# Patient Record
Sex: MALE
[De-identification: names, ages, dates, MRNs, and addresses within clinical notes are randomized per-mention and may not be internally consistent; named-entity substitution may affect disease eponyms.]

---

## 2018-02-16 ENCOUNTER — HOSPITAL ENCOUNTER (INPATIENT)
Dept: HOSPITAL 42 - ED | Age: 73
LOS: 4 days | Discharge: HOME | DRG: 65 | End: 2018-02-20
Attending: INTERNAL MEDICINE | Admitting: INTERNAL MEDICINE
Payer: MEDICARE

## 2018-02-16 VITALS — BODY MASS INDEX: 31.5 KG/M2

## 2018-02-16 DIAGNOSIS — I34.0: ICD-10-CM

## 2018-02-16 DIAGNOSIS — Q23.1: ICD-10-CM

## 2018-02-16 DIAGNOSIS — E78.00: ICD-10-CM

## 2018-02-16 DIAGNOSIS — I63.40: Primary | ICD-10-CM

## 2018-02-16 DIAGNOSIS — I25.10: ICD-10-CM

## 2018-02-16 DIAGNOSIS — E66.9: ICD-10-CM

## 2018-02-16 DIAGNOSIS — Z85.46: ICD-10-CM

## 2018-02-16 DIAGNOSIS — I42.9: ICD-10-CM

## 2018-02-16 DIAGNOSIS — Z79.01: ICD-10-CM

## 2018-02-16 DIAGNOSIS — I10: ICD-10-CM

## 2018-02-16 DIAGNOSIS — I27.20: ICD-10-CM

## 2018-02-16 DIAGNOSIS — I48.2: ICD-10-CM

## 2018-02-16 DIAGNOSIS — M19.90: ICD-10-CM

## 2018-02-16 DIAGNOSIS — M10.9: ICD-10-CM

## 2018-02-16 LAB
ALBUMIN SERPL-MCNC: 4.3 G/DL (ref 3–4.8)
ALBUMIN/GLOB SERPL: 1.3 {RATIO} (ref 1.1–1.8)
ALT SERPL-CCNC: 34 U/L (ref 7–56)
APTT BLD: 32.9 SECONDS (ref 25.1–36.5)
AST SERPL-CCNC: 38 U/L (ref 17–59)
BASOPHILS # BLD AUTO: 0.03 K/MM3 (ref 0–2)
BASOPHILS NFR BLD: 0.2 % (ref 0–3)
BUN SERPL-MCNC: 32 MG/DL (ref 7–21)
CALCIUM SERPL-MCNC: 10.5 MG/DL (ref 8.4–10.5)
EOSINOPHIL # BLD: 0.1 10*3/UL (ref 0–0.7)
EOSINOPHIL NFR BLD: 0.6 % (ref 1.5–5)
ERYTHROCYTE [DISTWIDTH] IN BLOOD BY AUTOMATED COUNT: 13.3 % (ref 11.5–14.5)
GFR NON-AFRICAN AMERICAN: 50
GRANULOCYTES # BLD: 8.67 10*3/UL (ref 1.4–6.5)
GRANULOCYTES NFR BLD: 62.3 % (ref 50–68)
HDLC SERPL-MCNC: 44 MG/DL (ref 29–60)
HGB BLD-MCNC: 14.7 G/DL (ref 14–18)
INR PPP: 1.08 (ref 0.93–1.08)
LDLC SERPL-MCNC: 96 MG/DL (ref 0–129)
LYMPHOCYTES # BLD: 3.2 10*3/UL (ref 1.2–3.4)
LYMPHOCYTES NFR BLD AUTO: 22.7 % (ref 22–35)
MCH RBC QN AUTO: 30.6 PG (ref 25–35)
MCHC RBC AUTO-ENTMCNC: 33.5 G/DL (ref 31–37)
MCV RBC AUTO: 91.5 FL (ref 80–105)
MONOCYTES # BLD AUTO: 2 10*3/UL (ref 0.1–0.6)
MONOCYTES NFR BLD: 14.2 % (ref 1–6)
PLATELET # BLD: 333 10^3/UL (ref 120–450)
PMV BLD AUTO: 10.2 FL (ref 7–11)
PROTHROMBIN TIME: 12.3 SECONDS (ref 9.4–12.5)
RBC # BLD AUTO: 4.8 10^6/UL (ref 3.5–6.1)
TROPONIN I SERPL-MCNC: < 0.01 NG/ML
WBC # BLD AUTO: 13.9 10^3/UL (ref 4.5–11)

## 2018-02-16 NOTE — ED PDOC
Arrival/HPI





- General


Chief Complaint: Dizziness/Lightheaded


Time Seen by Provider: 02/16/18 19:30


Historian: Patient





- History of Present Illness


Narrative History of Present Illness (Text): 


02/16/18 19:47


A 72 year old male, whose past medical history includes hypertension, prostate 

CA, and A-fib on Pradaxa, is brought in by ambulance, presents to the emergency 

department complaining of near-syncopal episode while at work. Patient reports 

experiencing, dizziness, as well as slurred speech and had difficulty speaking 

at the time (which has now resolved). Patient denies any headache, chest pain, 

shortness of breath, focal weakness, or any other complaints.  





PMD: Dr. Ward








Past Medical History





- Provider Review


Nursing Documentation Reviewed: Yes





- Infectious Disease


Hx of Infectious Diseases: None





- Cardiac


Hx Hypertension: Yes





- Genitourinary/Gynecological


Hx Prostate Cancer: Yes


Hx Prostate Problems: Yes





- Psychiatric


Hx Substance Use: No





- Anesthesia


Hx Anesthesia: No





Family/Social History





- Physician Review


Nursing Documentation Reviewed: Yes


Family/Social History: No Known Family HX


Smoking Status: Never Smoked


Hx Alcohol Use: No


Hx Substance Use: No





Allergies/Home Meds


Allergies/Adverse Reactions: 


Allergies





No Known Allergies Allergy (Verified 02/16/18 19:40)


 








Home Medications: 


 Home Meds











 Medication  Instructions  Recorded  Confirmed


 


Allopurinol [Zyloprim] 300 mg PO DAILY 01/03/17 01/03/17


 


Dabigatran [Pradaxa] 150 mg PO DAILY 01/03/17 01/03/17


 


Fenofibrate [Triglide] 160 mg PO DAILY 01/03/17 01/03/17


 


Furosemide [Lasix] 40 mg PO DAILY 01/03/17 01/03/17


 


Lisinopril [Zestril] 40 mg PO DAILY 01/03/17 01/03/17


 


Nebivolol [Bystolic] 20 mg PO DAILY 01/03/17 01/03/17


 


Pravastatin Sodium [Pravachol] 40 mg PO DAILY 01/03/17 01/03/17


 


Spironolactone [Aldactone] 25 mg PO DAILY 01/03/17 01/03/17














Review of Systems





- Physician Review


All systems were reviewed & negative as marked: Yes





- Review of Systems


Respiratory: absent: SOB


Cardiovascular: absent: Chest Pain, Syncope (near-syncopal episode)


Neurological: Dizziness, Speech Changes (patient was experieincing slurred 

speech and difficulty speaking at time of near-syncopal episode while at work.)

.  absent: Headache, Focal Weakness





Physical Exam


Vital Signs











  Temp Pulse Resp BP Pulse Ox


 


 02/16/18 23:23   89  16  137/82  97


 


 02/16/18 20:30   93 H  17  140/85  95


 


 02/16/18 20:03  98.5 F  90  17  123/58 L  98











Mental Status: Positive for: Alert and Oriented X 3





- Systems Exam


Head: Present: Atraumatic, Normocephalic


Pupils: Present: PERRL


Extroacular Muscles: Present: EOMI


Conjunctiva: Present: Normal


Mouth: Present: Moist Mucous Membranes


Neck: Present: Normal Range of Motion


Respiratory/Chest: Present: Clear to Auscultation, Good Air Exchange.  No: 

Respiratory Distress, Accessory Muscle Use


Cardiovascular: Present: Regular Rate and Rhythm, Normal S1, S2.  No: Murmurs


Abdomen: Present: Normal Bowel Sounds.  No: Tenderness, Distention, Peritoneal 

Signs


Back: Present: Normal Inspection


Upper Extremity: Present: Normal Inspection.  No: Cyanosis, Edema


Lower Extremity: Present: Normal Inspection.  No: Edema


Neurological: Present: GCS=15, CN II-XII Intact, Speech Normal


Skin: Present: Warm, Dry, Normal Color.  No: Rashes


Psychiatric: Present: Alert, Oriented x 3, Normal Insight, Normal Concentration





Medical Decision Making


ED Course and Treatment: 


02/16/18 20:01


Impression: 72 year old male with near-syncopal episode. Physical exam is 

benign.





Plan:


-- EKG


-- Chest xray


-- Head CT


-- Labs


-- Urinalysis 


-- IV Fluids


-- Reassess and disposition





Progress Notes:


02/16/18 20:40


Case had been d/w the neurologist .Pt. not a candidate for TPA given 

resolution of symptoms/on Pradaxa.





Report date: 02/16/18 21:14


EXAM: CT Head Without Intravenous Contrast


Dictated and Authenticated by: Brittney Bobo MD


IMPRESSION: Bilateral white matter changes. This is nonspecific and may include 

microangiopathic disease, small


lacunae of indeterminate chronicity, chronic infarcts and/or encephalomalacia.





02/16/18 22:15


Case discussed with Dr. Kuhn, who accepts admission under her service. Patient 

aware of and in agreement with plan.








- Lab Interpretations


Lab Results: 








 02/16/18 19:49 





 02/16/18 19:49 





 Lab Results





02/16/18 19:49: Sodium 143, Potassium 4.6, Chloride 104, Carbon Dioxide 28, 

Anion Gap 16, BUN 32 H, Creatinine 1.4, Est GFR (African Amer) > 60, Est GFR (

Non-Af Amer) 50, Random Glucose 102, Calcium 10.5, Total Bilirubin 0.5, AST 38, 

ALT 34, Alkaline Phosphatase 63, Troponin I < 0.01, Total Protein 7.7, Albumin 

4.3, Globulin 3.4, Albumin/Globulin Ratio 1.3, Triglycerides 73, Cholesterol 150

, LDL Cholesterol Direct 96, HDL Cholesterol 44


02/16/18 19:49: PT 12.3, INR 1.08, APTT 32.9


02/16/18 19:49: WBC 13.9 H, RBC 4.80, Hgb 14.7, Hct 43.9, MCV 91.5, MCH 30.6, 

MCHC 33.5, RDW 13.3, Plt Count 333, MPV 10.2, Gran % 62.3, Lymph % (Auto) 22.7, 

Mono % (Auto) 14.2 H, Eos % (Auto) 0.6 L, Baso % (Auto) 0.2, Gran # 8.67 H, 

Lymph # (Auto) 3.2, Mono # (Auto) 2.0 H, Eos # (Auto) 0.1, Baso # (Auto) 0.03











- RAD Interpretation


Narrative RAD Interpretations (Text): 





02/16/18 21:20


CXR- no acute process





Radiology Orders: 








02/16/18 19:53


HEAD W/O (CODE STROKE) [CT] Stat 


CHEST PORTABLE [RAD] Stat 











: ED Physician





- EKG Interpretation


EKG Interpretation (Text): 





02/16/18 21:20


EKG- Atrial fibrillation@ 87,NS T wave changes


Interpreted by ED Physician: Yes


Type: 12 lead EKG





- Medication Orders


Current Medication Orders: 








Sodium Chloride (Sodium Chloride 0.9%)  1,000 mls @ 100 mls/hr IV .Q10H [facility]


   Last Admin: 02/16/18 20:06  Dose: 100 mls/hr





eMAR Start Stop


 Document     02/16/18 20:06  HI  (Rec: 02/16/18 20:06  HI  [id number])


     Intravenous Solution


      Start Date                                 02/16/18


      Start Time                                 20:06














NIHSS Scale ([place])


Time Performed: 19:50





- How Severe is the Stoke


  ** Baseline


Level of Consciousness: 0=Alert


LOC to Questions: 0=Both comments correct


LOC to commands: 0=Obeys both correctly


Best Gaze: 0=Normal


Visual: 0=No visual loss


Facial: 0=Normal


Motor Arm - Left: 0=No drift


Motor Arm - Right: 0=No drift


Motor Leg - Left: 0=No drift


Motor Leg - Right: 0=No drift


Limb Ataxia: 0=Absent


Sensory: 0=Normal


Best Language: 0=No aphasia


Dysarthia: 0=Normal articulation


Extinction & Inattention (Neglect): 0=Normal, no object


Score: 0


Risk Level: No Stroke Risk





rTPA Inclusion/Exclusion





- Refusal of Treatment


Patient Refused Treatment: No





- Inclusion Criteria for Altepase


Patient is 18 years or Older: Yes


The Clinical Diagnosis of Ischemic Stroke That is Causing a Potentially 

Disabling Neurological Deficit: No


Time of Onset is Well Established to be Less Than 270 Minute Before Treatment 

Would Begin: Yes


Risk/Benefit Discussed With Patient/Family Member Present: Yes





- Exclusion Criteria for Altepase


Uncontrolled Hypertension at Time of Treatment (Systolic BP above 185 or 

Diastolic BP above 110 mmHg): No


Active Internal Bleeding: No


Known Bleeding Diathesis Including but Not Limited to: Platelets Below 100,000/

mm,PTT Above 40 sec After Heparin Use, Current Use of Oral Anitcoagulant With 

INR Greater Than 1.7 or PT Greater Than 15 secs: No


Evidence of an Intracranial Hemorrhage: No


Evidence of Major Acute Infarct With Signs Greater Than 1/3 MCA Territory: No


Suspicion of Subarachnoid Hemorrhage on Pretreatment Evaluation Even if CT Head 

Negative For Hemorrhage: No





- Warning to TPA With Conditions


Condition: Rapid Improvement





NIHSS Stroke Scale 3





- Date/Time Evaluation Performed


Date Performed: 02/16/18


Time Performed: 19:47





- Scribe Statement


The provider has reviewed the documentation as recorded by the Jordin Mantilla





Provider Scribe Attestation:


All medical record entries made by the Scribestela were at my direction and 

personally dictated by me. I have reviewed the chart and agree that the record 

accurately reflects my personal performance of the history, physical exam, 

medical decision making, and the department course for this patient. I have 

also personally directed, reviewed, and agree with the discharge instructions 

and disposition.








Disposition/Present on Arrival





- Present on Arrival


Any Indicators Present on Arrival: No


History of DVT/PE: No


History of Uncontrolled Diabetes: No


Urinary Catheter: No


History of Decub. Ulcer: No


History Surgical Site Infection Following: None





- Disposition


Have Diagnosis and Disposition been Completed?: Yes


Diagnosis: 


 Near syncope, TIA (transient ischemic attack)





Disposition: HOSPITALIZED


Disposition Time: 22:10


Patient Plan: Observation


Patient Problems: 


 Current Active Problems











Problem Status Onset


 


Near syncope Acute  


 


TIA (transient ischemic attack) Acute  











Condition: STABLE

## 2018-02-16 NOTE — CT
EXAM:

  CT Head Without Intravenous Contrast



CLINICAL HISTORY:

  72 years old, male; Signs and symptoms; Speech disturbance; Slurred speech; 

Additional info: Code stroke



TECHNIQUE:

  Axial computed tomography images of the head/brain without intravenous 

contrast.  All CT scans at this facility use one or more dose reduction 

techniques, viz.: automated exposure control; ma/kV adjustment per patient size 

(including targeted exams where dose is matched to indication; i.e. head); or 

iterative reconstruction technique.

  Coronal and sagittal reformatted images were created and reviewed.



COMPARISON:

  No relevant prior studies available.



FINDINGS:

  Brain:  Bilateral white matter changes. This is nonspecific and may include 

microangiopathic disease, small lacunae of indeterminate chronicity, chronic 

infarcts and/or encephalomalacia. No hemorrhage.  No edema.

  Ventricles:  No hydrocephalus.

  Bones:  Skull is intact.

  Sinuses:  Mild paranasal sinus mucosal thickening. No acute sinusitis.

  Mastoid air cells:  No mastoid effusion.



IMPRESSION:     

  Bilateral white matter changes. This is nonspecific and may include 

microangiopathic disease, small lacunae of indeterminate chronicity, chronic 

infarcts and/or encephalomalacia. 

  Acute infarcts/early ischemic changes may not be detectable by this modality; 

MRI is more sensitive in detecting acute ischemia.

## 2018-02-17 LAB
ERYTHROCYTE [DISTWIDTH] IN BLOOD BY AUTOMATED COUNT: 13.3 % (ref 11.5–14.5)
HGB BLD-MCNC: 14.1 G/DL (ref 14–18)
MCH RBC QN AUTO: 30.5 PG (ref 25–35)
MCHC RBC AUTO-ENTMCNC: 33.5 G/DL (ref 31–37)
MCV RBC AUTO: 91.1 FL (ref 80–105)
PLATELET # BLD: 316 10^3/UL (ref 120–450)
PMV BLD AUTO: 9.9 FL (ref 7–11)
RBC # BLD AUTO: 4.62 10^6/UL (ref 3.5–6.1)
WBC # BLD AUTO: 9.3 10^3/UL (ref 4.5–11)

## 2018-02-17 NOTE — CARD
--------------- APPROVED REPORT --------------





EKG Measurement

Heart [id number]

[id number]

[id number]

[id number]



<Conclusion>

Atrial fibrillation

Minimal voltage criteria for LVH, may be normal variant

Nonspecific T wave abnormality

No change

## 2018-02-17 NOTE — CP.PCM.CON
History of Present Illness





- History of Present Illness


History of Present Illness: 





Neurology Consultation Note:





Mr. Mack is a 72-year-old man with a past medical history of hypertension

, prostate CA, and A-fib on Pradaxa, who presented to the ED yesterday after an 

episode of slurred speech, dizziness and near-syncope.  When he was evaluated 

in the ED, he was back to baseline and asymptomatic.  However, due to his 

multiple risk factors for stroke and the duration of symptoms, he was admitted 

for posterior circulation TIA. 





Review of Systems





- Review of Systems


All systems: reviewed and no additional remarkable complaints except





Past Patient History





- Infectious Disease


Hx of Infectious Diseases: None





- Past Social History


Smoking Status: Never Smoked





- CARDIAC


Hx Cardiac Disorders: Yes


Hx Cardia Arrhythmia: Yes (A-fib)


Hx Hypercholesterolemia: Yes


Hx Hypertension: Yes





- PULMONARY


Hx Respiratory Disorders: No





- NEUROLOGICAL


Hx Neurological Disorder: No





- HEENT


Hx HEENT Problems: No





- RENAL


Hx Chronic Kidney Disease: No





- HEMATOLOGICAL/ONCOLOGICAL


Hx Blood Disorders: Yes


Hx Anemia: Yes





- INTEGUMENTARY


Hx Dermatological Problems: No





- MUSCULOSKELETAL/RHEUMATOLOGICAL


Hx Musculoskeletal Disorders: Yes


Hx Falls: No


Hx Fractures: Yes (knee surgery)


Hx Gout: Yes





- GASTROINTESTINAL


Hx Gastrointestinal Disorders: No





- GENITOURINARY/GYNECOLOGICAL


Hx Genitourinary Disorders: Yes


Hx Prostate Problems: Yes (HX of prostate cancer)





- PSYCHIATRIC


Hx Psychophysiologic Disorder: No





- SURGICAL HISTORY


Hx Surgeries: Yes


Hx Cardiac Catheterization: Yes (PTCA)


Hx Cholecystectomy: Yes


Hx Orthopedic Surgery: Yes (knee surgery)





- ANESTHESIA


Hx Anesthesia: No





Meds


Allergies/Adverse Reactions: 


 Allergies











Allergy/AdvReac Type Severity Reaction Status Date / Time


 


No Known Allergies Allergy   Verified 18 19:40














- Medications


Medications: 


 Current Medications





Acetaminophen (Tylenol 325mg Tab)  650 mg PO Q6H PRN


   PRN Reason: Fever >100.4 F


Allopurinol (Zyloprim)  300 mg PO DAILY [facility]


Atorvastatin Calcium (Lipitor)  40 mg PO DIN [facility]


   Last Admin: 18 17:00 Dose:  40 mg


Dabigatran (Pradaxa)  75 mg PO BID [facility]


   PRN Reason: Protocol


   Last Admin: 18 17:00 Dose:  75 mg


Fenofibrate (Tricor)  145 mg PO DAILY [facility]


Furosemide (Lasix)  40 mg PO DAILY [facility]


Sodium Chloride (Sodium Chloride 0.9%)  1,000 mls @ 50 mls/hr IV .Q20H [facility]


   Last Admin: 18 10:57 Dose:  50 mls/hr


Lisinopril (Zestril)  20 mg PO BID [facility]


   Last Admin: 18 17:00 Dose:  20 mg


Nitroglycerin (Nitro-Bid 2% Oint)  1 ea TOP Q4H PRN


   PRN Reason: accelerated hypertension


Ondansetron HCl (Zofran Inj)  4 mg IVP Q6H PRN


   PRN Reason: Nausea/Vomiting


Spironolactone (Aldactone)  12.5 mg PO DAILY [facility]











Physical Exam





- Constitutional


Appears: Well





- Head Exam


Head Exam: ATRAUMATIC, NORMAL INSPECTION, NORMOCEPHALIC





- Eye Exam


Eye Exam: EOMI, Normal appearance, PERRL





- Neck Exam


Neck exam: Positive for: Normal Inspection





- Respiratory Exam


Respiratory Exam: Clear to Auscultation Bilateral, NORMAL BREATHING PATTERN





- GI/Abdominal Exam


GI & Abdominal Exam: Normal Bowel Sounds, Soft.  absent: Tenderness





- Rectal Exam


Rectal Exam: Deferred





- Back Exam


Back exam: NORMAL INSPECTION





- Neurological Exam


Neurological exam: Alert, CN II-XII Intact, Normal Gait, Oriented x3, Reflexes 

Normal


Additional comments: 





NIHSS = 0





- Psychiatric Exam


Psychiatric exam: Normal Affect, Normal Mood





- Skin


Skin Exam: Dry, Intact, Normal Color, Warm





Results





- Vital Signs


Recent Vital Signs: 


 Last Vital Signs











Temp  98.2 F   18 12:00


 


Pulse  61   18 14:00


 


Resp  17   18 12:00


 


BP  113/68   18 12:00


 


Pulse Ox  96   18 05:33














- Labs


Result Diagrams: 


 18 10:40





 18 19:49


Labs: 


 Laboratory Results - last 24 hr











  18





  10:40


 


WBC  9.3  D


 


RBC  4.62


 


Hgb  14.1


 


Hct  42.1


 


MCV  91.1


 


MCH  30.5


 


MCHC  33.5


 


RDW  13.3


 


Plt Count  316


 


MPV  9.9














Assessment & Plan


(1) TIA (transient ischemic attack)


Assessment and Plan: 


The patient is on Pradaxa for atrial fibrillation and is stable without 

complications.  I recommend continuing this medication for stroke prevention.  

In addition, I recommend the followin. Telemetry


2. MRI brain without contrast, and MRA of the head/neck without contrast


3. Echocardiogram with bubble study


4. Continue Lipitor at current dose


5. PT/OT eval and treatment


6. Fluids with NS at 100 mL/hr


7. Check lipid panel, HbA1c, TSH, B12, folate, vitamin D levels


8. Case management consult





Thank you. 








Status: Acute   Priority: High

## 2018-02-17 NOTE — RAD
HISTORY:

Code Stroke  



COMPARISON:

No prior. 



FINDINGS:



LUNGS:

No active pulmonary disease.



PLEURA:

No significant pleural effusion identified, no pneumothorax apparent.



CARDIOVASCULAR:

Normal.



OSSEOUS STRUCTURES:

No significant abnormalities.



VISUALIZED UPPER ABDOMEN:

Normal.



OTHER FINDINGS:

None.



IMPRESSION:

No active disease.

## 2018-02-17 NOTE — HP
DATE OF EXAM:  02/17/2018



HISTORY OF PRESENT ILLNESS:  This 72-year-old male was examined at his

bedside in the presence of nurse, Meagan Ascencio, registered nurse.  The

patient was admitted late last evening through the emergency room with

concerns of possible transient ischemic attack.  According to the patient,

he came home last evening felt dizzy, also was noticing problems with his

speech and was concerned about the possibility of a stroke.  He came to the

[facility] (formerly [facility]) at that time.  Upon arrival, had no further symptoms

of either dizziness or slurred speech.  The patient is admitted to rule out

transient ischemic attack.



PAST MEDICAL HISTORY:  The patient's past medical history is extensive and

includes history of atherosclerotic heart disease stable.  He denies any

knowledge of cardiac cath or stroke.  He recently in the month of January 2017

underwent cardiac testing including 2D echocardiogram and stress test that

were notable for enlarged left atrium, borderline left ventricular ejection

fraction, and evidence of mitral regurgitation with an unremarkable stress

test in the setting of longstanding chronic atrial fibrillation.  The

patient also has history of hyperlipidemia, hypertension, gout, obesity.



OUTPATIENT MEDICATIONS:  Include Aldactone, Lasix, Lipitor, Pradaxa,

Tricor, Zestril, Zyloprim, Pravachol, Bystolic.  Of note, the patient was

given IV Lopressor in the emergency room for accelerated hypertension. 

Also takes digoxin as an outpatient and was noted to have episodes of

bradyarrhythmia on the cardiac monitor earlier this morning.



ALLERGIES:  PATIENT DENIES ANY ALLERGIES TO MEDICATION.



He is status post prostate cancer, has completed Lupron injections with his

[facility] urologist and states he has had a gallbladder removal and

left knee surgery in the distant past.  The patient states he has had

chronic atrial fibrillation since 2006, was initially treated with Coumadin

which was then switched to Pradaxa because of issues with bruising on

Coumadin.



REVIEW OF SYSTEMS:

CONSTITUTIONAL:  He denied any fever or chills.

HEAD:  Denied knowledge of seizure or stroke.

EYE:  No change in visual acuity.

EARS:  No hearing loss.

THROAT:  No swallowing difficulty.

NECK:  No stiffness.

CARDIAC:  As per HPI.

PULMONARY:  No cough.  No hemoptysis.

GASTROINTESTINAL:  He has a history of gallbladder removal.

GENITOURINARY:  He has prostate cancer.

VASCULAR:  No claudication.

PSYCHOLOGICAL:  No depression.

NEUROLOGICAL:  No knowledge of stroke.

HEMATOLOGICAL:  No knowledge of anemia.

ENDOCRINOLOGICAL:  No diabetes, but hyperlipidemia.



FAMILY HISTORY: Noncontributory.



SOCIAL HISTORY:  He is a nondrinker, nonsmoker, non IV drug misuser.  He is

employed as a .



PHYSICAL EXAMINATION:

VITAL SIGNS:  Cardiac monitor, atrial fibrillation.  Temperature 98.2,

respirations 17, pulse 61, blood pressure 113/68, and pulse ox 96% on room

air.

HEENT:  Head normocephalic, atraumatic.  Eyes:  No icterus.  Ears:  Clear. 

Throat:  Noninjected.

NECK:  Supple.

HEART:  Irregular, S1, S2.

LUNGS:  Clear.

ABDOMEN:  Obese.  Well-healed vertical incisional scar from previous

cholecystectomy.

EXTREMITIES:  No clubbing, no cyanosis, no edema.

SKIN:  Without rash.

NEUROLOGICAL:  Intact.  5/5 motor strength.  Sensory intact.  Cranial

nerves intact. Speech is fluent.



LABORATORY DATA:  Initial white count 13,900, now 9300; hemoglobin 14.1;

hematocrit 42.1; platelets rate 316,000.  PT/INR 1.08, PTT 32.9.  Sodium

143, K 4.6, chloride 104, bicarb 28.  BUN 32, creatinine 1.4.  Random blood

sugar 102.  Hemoglobin A1c 6.3.  Bilirubin 0.5, AST 38, ALT 34, alk phos

63.  Troponin less than 0.01.  Cholesterol 150, triglyceride 73, LDL 96,

HDL 44.  Chest x-ray was reviewed.  It showed no active pulmonary disease,

no pneumothorax, no infiltrate, no pleural effusion.  EKG showed atrial

fibrillation with nonspecific ST-T wave changes.  Head CT was reviewed. 

There were bilateral white matter changes.  This was felt to be

nonspecific.



IMPRESSION:  A 72-year-old male with admission for dizziness and speech

impediment, now resolved, rule out transient ischemic attack with

comorbidities of obesity, stable atherosclerotic heart disease, chronic

hypertension, chronic atrial fibrillation, hyperlipidemia, degenerative

arthritis, and gout.



PLAN:  The plan as discussed with the patient, emergency room physician,

and Nursing at bedside will be to maintain this patient on the cardiac

unit.  He is ordered to have physical therapy for reconditioning and gait

training while maintaining neuro checks q. shift, high fall risk protocol,

and neurological evaluation with Dr. Samuel Swain from Neurology.  He is

ordered to have a heart-healthy soft bland diet, nasal O2 p.r.n.  He will

continue on Zyloprim 300 mg p.o. daily, Zestril 20 mg p.o. b.i.d., Tricor

145 mg p.o. daily, 0.9 saline at 50 mL/hour, Pradaxa 75 mg p.o. b.i.d.,

nitroglycerin 1 inch to chest wall q. 4 hours if systolic blood pressure

greater than 160 or diastolic blood pressure greater than 100, Lipitor 40

mg p.o. daily, Lasix 40 mg p.o. daily, and Aldactone 12.5 mg p.o. daily. 

He is ordered to have a basic metabolic panel and lipid panel in the a.m.,

and I have asked the nurse to coordinate with Dr. Tiburcio Swain any additional

neurological testing.  He may deem fit at this point.  Echocardiography and

stress testing were reviewed from January 2017, and I will await further

evaluation by Neurology before ordering additional testing at this time.



Greater than 75 minutes was spent in the care management, review of x-rays,

labs, medication, and orders for this patient today.  All questions were

answered.











__________________________________________

Polly Kuhn MD



DD:  02/17/2018 15:41:14

DT:  02/17/2018 15:44:06

Job # [id number]

[id number]

## 2018-02-18 LAB
BUN SERPL-MCNC: 24 MG/DL (ref 7–21)
CALCIUM SERPL-MCNC: 9.7 MG/DL (ref 8.4–10.5)
FOLATE SERPL-MCNC: 10.5 NG/ML
GFR NON-AFRICAN AMERICAN: 59
HDLC SERPL-MCNC: 33 MG/DL (ref 29–60)
LDLC SERPL-MCNC: 99 MG/DL (ref 0–129)
VIT B12 SERPL-MCNC: 309 PG/ML (ref 239–931)

## 2018-02-18 NOTE — CP.PCM.PN
Subjective





- Date & Time of Evaluation


Date of Evaluation: 02/18/18


Time of Evaluation: 07:11





- Subjective


Subjective: 





Mr. Mack was seen and examined at the bedside. He is alert, oriented in 

all spheres. He denies any headache, blurred vision, diplopia, dizziness, 

lightheadedness, nausea, or vomiting. He is able to follow simple commands. 

There was no untoward events overnight.





Objective





- Vital Signs/Intake and Output


Vital Signs (last 24 hours): 


 











Temp Pulse Resp BP Pulse Ox


 


 97.3 F L  59 L  20   113/63   96 


 


 02/18/18 05:50  02/18/18 05:50  02/18/18 05:50  02/18/18 05:50  02/18/18 05:50








Intake and Output: 


 











 02/18/18 02/18/18





 06:59 18:59


 


Intake Total 720 


 


Output Total 1500 


 


Balance -780 














- Medications


Medications: 


 Current Medications





Acetaminophen (Tylenol 325mg Tab)  650 mg PO Q6H PRN


   PRN Reason: Fever >100.4 F


Allopurinol (Zyloprim)  300 mg PO DAILY [facility]


Atorvastatin Calcium (Lipitor)  40 mg PO DIN [facility]


   Last Admin: 02/17/18 17:00 Dose:  40 mg


Dabigatran (Pradaxa)  75 mg PO BID [facility]


   PRN Reason: Protocol


   Last Admin: 02/17/18 17:00 Dose:  75 mg


Fenofibrate (Tricor)  145 mg PO DAILY [facility]


Furosemide (Lasix)  40 mg PO DAILY [facility]


Sodium Chloride (Sodium Chloride 0.9%)  1,000 mls @ 50 mls/hr IV .Q20H [facility]


   Last Admin: 02/18/18 05:53 Dose:  50 mls/hr


Lisinopril (Zestril)  20 mg PO BID [facility]


   Last Admin: 02/17/18 17:00 Dose:  20 mg


Nitroglycerin (Nitro-Bid 2% Oint)  1 ea TOP Q4H PRN


   PRN Reason: accelerated hypertension


Ondansetron HCl (Zofran Inj)  4 mg IVP Q6H PRN


   PRN Reason: Nausea/Vomiting


Spironolactone (Aldactone)  12.5 mg PO DAILY [facility]











- Labs


Labs: 


 





 02/17/18 10:40 





 02/18/18 05:40 





 











PT  12.3 SECONDS (9.4-12.5)   02/16/18  19:49    


 


INR  1.08  (0.93-1.08)   02/16/18  19:49    


 


APTT  32.9 Seconds (25.1-36.5)   02/16/18  19:49    














- Constitutional


Appears: No Acute Distress





- Head Exam


Head Exam: NORMAL INSPECTION





- Neurological Exam


Neurological Exam: Alert, Awake, CN II-XII Intact, Oriented x3


Neuro motor strength exam: Left Upper Extremity: 5, Right Upper Extremity: 5, 

Left Lower Extremity: 5, Right Lower Extremity: 5


Additional comments: 





He is alert, oriented x3, follows commands. Sensation is intact.





Assessment and Plan


(1) TIA (transient ischemic attack)


Assessment & Plan: 


Case discussed with Dr. Swain, continue all current medical, physical, and 

occupational therapies. Pending MRI of the brain, MRA of the heada and neck, 

echocardiogram with bubble study. Recommend to check TSH, B12, folate, vitamin 

D levels.


Status: Acute

## 2018-02-18 NOTE — MRI
PROCEDURE:  MRI BRAIN WITHOUT CONTRAST



HISTORY:

TIA



COMPARISON:

Comparison made with prior CT scan of the brain dated 02/16/2018 



TECHNIQUE:

Multiplanar, multisequence MR images of the brain were obtained 

without intravenous contrast enhancement. This examination is limited 

due to motion artifact. 



FINDINGS:



HEMORRHAGE:

No acute parenchymal, subarachnoid or extra-axial hemorrhage. No 

evidence of hemosiderin deposition identified on gradient echo 

weighted sequence.



DWI:

There is a small acute/ subacute infarct in the right anterolateral 

and mid-superior vermis. No additional acute infarct so far as can be 

seen.  Note that the diffusion imaging sequences quite limited due to 

crossing artifact



BRAIN PARENCHYMA:

Mild diffuse/ confluent chronic periventricular white matter ischemic 

changes with multiple tiny lacunar type infarcts scattered about the 

deep and subcortical white matter both cerebral hemispheres. 

Additionally, there are chronic appearing brainstem lacunar type 

ischemic changes.  There may also be scattered chronic bilateral 

basal nuclei lacunar type infarcts difficult to distinguish from 

numerous dilated perivascular spaces. 



Moderate volume loss. 



VENTRICLES:

No obstructive hydrocephalus.



CRANIUM:

There are no acute calvarial fractures.



ORBITS:

Orbits and contents grossly unremarkable.



PARANASAL SINUSES/MASTOIDS:

Minimal mucosal thickening noted within the ethmoid air complex



VASCULAR SYSTEM:

Visualized major vascular flow voids at skull base are patent.



OTHER FINDINGS:

None. 



IMPRESSION:

Limited motion degraded study. 



There is a small acute/ subacute right vermian infarct.



Mild chronic white matter and brainstem ischemic changes. There may 

also be a few scattered chronic bilateral basal nuclei lacunar type 

infarcts difficult to distinguish from numerous dilated perivascular 

spaces. 



Moderate volume loss. 



Acute findings discussed with [address] [place] charge nurse Tam at 

approximately 1:21 p.m. with written down and read back verification.

## 2018-02-18 NOTE — MRI
PROCEDURE:  MRA of the brain dated 02/18/2018



HISTORY:

TIA



COMPARISON:

None available. 



TECHNIQUE:

3D time of flight MR angiography of the intracranial arteries was 

performed. Rotating maximum intensity projection images were 

generated. Study is limited by with duplication of the intra cerebral 

vasculature possibly secondary to utilization of the flex coil for 

this exam as patient was unable to fit into standard coronal. 



FINDINGS:



INTERNAL CAROTID ARTERIES:

Unremarkable. The skull base, petrous, cavernous and supraclinoid 

segments are bilaterally widely patient. 



ANTERIOR CEREBRAL ARTERIES:

Unremarkable. A1 and A2 segments are widely patent. Smaller distal 

branches unremarkable, as visualized.



MIDDLE CEREBRAL ARTERIES:

Unremarkable. M1 and M2 segments are widely patent. Perisylvian 

branches grossly symmetric.



POSTERIOR CIRCULATION:

Basilar Artery: Unremarkable.



Distal Vertebral Arteries: Unremarkable.



Posterior Cerebral Arteries: Unremarkable.



Posterior Inferior Cerebellar Arteries: Unremarkable.



ANEURYSM/ VASCULAR MALFORMATIONS:

No evidence of large aneurysm nor vascular malformation. .



OTHER FINDINGS:

None. 



IMPRESSION:

Limited study as described.  None no evidence of occlusion or 

significant stenotic lesion.  No definitive evidence of large 

aneurysm nor vascular malformation on within limitation of the exam

## 2018-02-18 NOTE — CARD
--------------- APPROVED REPORT --------------





EKG Measurement

Heart [id number]

[id number]

[id number]

[id number]



<Conclusion>

Atrial fibrillation

NSSTW changes

No change

## 2018-02-18 NOTE — MRI
PROCEDURE:  MRA angiography of the neck dated 02/18/2018 and 



HISTORY:

TIA 



COMPARISON:

No prior study available for comparison 



TECHNIQUE:

2D and 3D time-of-flight (TOF) angiography of the neck was performed. 

Rotating maximum intensity projection images of the cervical carotid 

and vertebral arteries were generated. The origins of the common 

carotid arteries were not visualized, which is a limitation inherent 

to the non-contrast time of flight technique. Examination is limited 

likely due to utilization of flex coil for this study as patient was 

unable to fit into standard coil



FINDINGS:

The the carotid bifurcations proximal and distal internal carotid 

arteries are not visualized on the and 3D time-of-flight sequences 

though are visible though poorly seen on the 2D time-of-flight 

imaging .  There is also a abrupt cut off of a short segment of the 

right vertebral artery on 3D imaging though.  This segment is visible 

on 2D imaging.  No definitive evidence of occlusion or significant 

stenosis however carotid Doppler study is recommended for further 

evaluation 



RIGHT CAROTID ARTERIES:

Common Carotid Artery: Normal.



Carotid Bifurcation: Normal.



Internal Carotid Artery:Normal.



External Carotid Artery (proximal branches): Normal.



LEFT CAROTID ARTERIES:

Common Carotid Artery: Normal.



Carotid Bifurcation: Normal.



Internal Carotid Artery:Normal.



External Carotid Artery (proximal branches): Normal.



VERTEBRAL ARTERIES:

Right Vertebral Artery: Normal.



Left Vertebral Artery: Normal.



OTHER FINDINGS:

None.



IMPRESSION:

There is abrupt cut off of the of a short segment of the right 

vertebral artery on the 3D time-of-flight at imaging felt to be 

secondary to artifact on the 3D sequences as the same segment of 

vertebral arteries visible on 2D sequences on the. New.  The carotid 

bifurcations and distal internal carotid arteries are also not well 

visualized on the 3D sequences though are visualized on 2D sequences. 

Recommend follow-up carotid Doppler exam.

## 2018-02-18 NOTE — PN
DATE:  02/18/2018



SUBJECTIVE:  This 72-year-old male was examined at the bedside and this

case was reviewed in detail with himself and his nurse, Debra Turcios. 

The patient remains alert and oriented.  He is denying any chest pain,

fever, chills, shortness of breath, slurred speech or motor weakness.  He

has been seen by Neurology and he is awaiting testing including MRA of the

brain and neck as well as MRI and additional lab testing including vitamin

B12, vitamin D 25-hydroxy and folic acid levels.  The patient remains in

atrial fibrillation rhythm on the cardiac monitor and has had no further

pauses since withholding his digoxin and beta-blockers.



PHYSICAL EXAMINATION:

VITAL SIGNS:  Temperature is 97.9, respirations 20, pulse 75, blood

pressure 122/74, pulse ox 97% on room air.

HEENT:  Head:  Normocephalic, atraumatic.  Eyes:  No icterus.  Ears: 

Clear.  Throat:  Noninjected.

NECK:  Supple.

HEART:  Irregular S1, S2.

LUNGS:  Clear.

ABDOMEN:  Soft.

EXTREMITIES:  No edema.

SKIN:  Without rash.

NEUROLOGICAL:  Intact.  Motor strength 5/5.  Cranial nerves intact.

PSYCHOLOGICAL:  Alert and oriented x3.  Speech fluent.

VASCULAR:  Legs warm to touch.



LABORATORY DATA AND IMAGING:  White count 9300, hemoglobin 14.1, hematocrit

42.1, platelets 316,000.  PT/INR 1.08.  Sodium 142, K 4.3, chloride 105,

bicarb 26, BUN 24, creatinine 1.2, random blood sugar 106.  Estimated GFR

59 mL/minute.  Cholesterol 152, triglycerides 113, LDL 99, HDL 33.  TSH

normal 2.28.  Troponin less than 0.01.  Hemoglobin A1c 6.3.  Head CT showed

no acute infarct or hemorrhage.  EKG showed atrial fibrillation with

nonspecific ST-T wave changes.  Chest x-ray showed no evidence of

infiltrate, pneumothorax, effusion or congestive heart failure.



IMPRESSION AND PLAN:  This is a 72-year-old male status post transient

ischemic attack, rule out stroke who presented with dizziness and altered

speech at home, now resolved with comorbidities of stable atherosclerotic

heart disease, history of hypertension, hyperlipidemia, chronic atrial

fibrillation, degenerative arthritis and gout.  The plan as discussed with

the patient and nursing will be to continue spironolactone, Lasix, Lipitor,

Pradaxa, 0.9 saline, TriCor, Zestril and allopurinol.  He is scheduled for

echo, MRA of neck, MRA of brain, MRI.  He continues on heart-healthy diet,

fall protocol, neuro checks q.6 and cardiac monitoring with physical and

occupational therapy requested.  Based on clinical results and progress,

additional testings will be entertained and all of the above were reviewed

in detail with the patient, nursing and Neurology co-consultants.  Greater

than 35 minutes was spent in the care management, review of x-rays, labs,

medication and ordering of testing for this patient today.  All questions

were answered.





__________________________________________

Polly Kuhn MD





DD:  02/18/2018 14:35:22

DT:  02/18/2018 14:37:18

Job # [id number]

MTDD

## 2018-02-19 NOTE — CT
PROCEDURE:  CT Angiography of the neck with contrast



HISTORY:

Stroke



COMPARISON:

None available. 



TECHNIQUE:

Contiguous axial images of the neck were obtained from the level of 

the skull-base to the superior mediastinum in the arteriographic 

phase of enhancement. Coronal and sagittal reformats or also 

generated. 



IV contrast dose: 150 cc of Omni 350



Radiation Dose - DLP: 604 mGy-cm 



This CT exam was performed using one or more of the following dose 

reduction techniques: Automated exposure control, adjustment of the 

mA and/or kV according to patient size, and/or use of iterative 

reconstruction technique.



FINDINGS:



RIGHT CAROTID ARTERIES:

Common Carotid Artery: Normal.



Carotid Bifurcation: Normal.



Internal Carotid Artery:Normal.



External Carotid Artery (proximal branches): Normal.



LEFT CAROTID ARTERIES:

Common Carotid Artery: Normal.



Carotid Bifurcation: Normal.



Internal Carotid Artery:Normal.



External Carotid Artery (proximal branches): Normal.



VERTEBRAL ARTERIES:

Right Vertebral Artery: Normal.



Left Vertebral Artery: Normal.



OTHER FINDINGS:

There is tortuosity of the internal carotids



IMPRESSION:

No carotid or vertebral stenosis



CT Angiography of the Brain.



HISTORY:

Stroke



COMPARISON:

None available. 



TECHNIQUE:

CT angiography of the intracranial arteries was performed. Coronal 

and sagittal maximum intensity projection reformated images were 

generated.



This CT exam was performed using one or more of the following dose 

reduction techniques: Automated exposure control, adjustment of the 

mA and/or kV according to patient size, and/or use of iterative 

reconstruction technique.



FINDINGS:



INTERNAL CEREBRAL ARTERIES:

Unremarkable. The skull base, petrous, cavernous and supraclinoid 

segments are bilaterally widely patent. 



ANTERIOR CEREBRAL ARTERIES:

Unremarkable. A1 and A2 segments are widely patent. Smaller distal 

branches unremarkable, as visualized.



MIDDLE CEREBRAL ARTERIES:

Unremarkable. M1 and M2 segments are widely patent. Perisylvian 

branches grossly symmetric.



POSTERIOR CIRCULATION:

Basilar Artery: Unremarkable.



Distal Vertebral Arteries: Unremarkable.



Posterior Cerebral Arteries: Unremarkable.



Posterior Inferior Cerebellar Arteries: Unremarkable.



ANEURYSM/ VASCULAR MALFORMATIONS:

None.



OTHER FINDINGS:

None. 



IMPRESSION:

Unremarkable CT Angiography of the Brain.

## 2018-02-19 NOTE — PN
DATE:  02/19/2018



SUBJECTIVE:  This 72-year-old male was examined at the bedside and the case

was reviewed in detail with himself and Nursing.  There was an 

present for the interview.  The patient now has evidence of a right vermian

infarct on MRI and CTA of the head and neck as well as carotid ultrasounds

are pending.  Also pending is the patient's 2D echocardiogram.  The patient

denies any further slurred speech, dizziness, or syncopal events and is

ambulating independently on the cardiac monitor.  He denies fever, chills,

chest pain, or shortness of breath and is in an atrial fibrillation rhythm

on the cardiac monitor.



OBJECTIVE:

VITAL SIGNS:  Temperature 97.5, respirations 20, pulse 70, and blood

pressure 116/62 with a pulse ox of 98% room air.

HEENT:  Head normocephalic, atraumatic.  Eyes:  No icterus.  Ears:  Clear. 

Throat:  Noninjected.

NECK:  Supple.

HEART:  Irregular, S1, S2.

LUNGS:  Clear.

ABDOMEN:  Soft.

EXTREMITIES:  No edema.

SKIN:  Without rash.

NEUROLOGICAL:  Intact.  Cranial nerves II through XII intact.

PSYCHOLOGICAL:  Alert.

VASCULAR:  Legs warm to touch.



LABORATORY DATA:  White count 9300, hemoglobin 14.1, hematocrit 42.1, and

platelets 316,000.  PT/INR 1.08, PTT 32.9.  Sodium 142, potassium 4.3,

chloride 105, bicarb 26.  BUN 24, creatinine 1.2.  Random blood sugar 106. 

Cholesterol 152, triglycerides 113, LDL 99, HDL 33.  B12 normal 309. 

Vitamin D level 36.2, normal.  Folic acid 10.5, normal.  TSH 2.28 normal.



IMPRESSION:  A 72-year-old male with a right vermian infarct on MRI, who

presented with history of slurred speech and dizziness with near syncope

with comorbidities of obesity, chronic hypertension, chronic atrial

fibrillation, previously on Pradaxa, now upgraded to Eliquis 5 mg p.o.

b.i.d. by Neurology, also with hyperlipidemia, degenerative arthritis, and

gout.  The plan as discussed with the patient, Nursing, and Neurology will

be to maintain this patient on the cardiac unit.  He is receiving physical

therapy for ambulation safety.  He remains on neuro checks q. shift and

fall precautions and is continuing on a heart-healthy diet.  He is

scheduled for CTA of the head and neck, carotid ultrasound, and

echocardiography is pending.  He continues on Zyloprim, Zestril, Tricor,

Lipitor, Lasix, Eliquis, and Aldactone.  Ultimate plan will be for

discharge to home when medically cleared by Neurology.



Greater than 35 minutes was spent in the care, review of x-rays, labs,

medication, and ordering of testings and discussion with this patient

regarding all of the above.  All questions were answered.









__________________________________________

Polly Kuhn MD



DD:  02/19/2018 14:54:21

DT:  02/19/2018 14:57:44

Job # [id number]

MTDD

## 2018-02-19 NOTE — US
PROCEDURE:  Bilateral carotid artery duplex ultrasound 



HISTORY:

Carotid stenosis 



PHYSICIAN(S):  Kalen Rasmussen MD.



TECHNIQUE:

Duplex sonography and color-flow Doppler were used to evaluate the 

carotid bifurcations and limited segments of the vertebral arteries 

bilaterally.



FINDINGS:

There is mild smooth heterogeneous plaque noted at the carotid 

bifurcations bilaterally. The peak systolic velocity in the proximal 

right internal carotid artery is 60 cm/sec. This corresponds to a 20 

to 39% proximal right ICA stenosis. Normal systolic velocities are 

noted in the proximal right external carotid artery. There is 

antegrade flow in the right vertebral artery.



The peak systolic velocity in the proximal left internal carotid 

artery is 78 cm/sec. This corresponds to a 20 to 39% proximal left 

ICA stenosis. Normal systolic velocities are noted in the proximal 

left external carotid artery. There is antegrade flow in the left 

vertebral artery.



IMPRESSION:

1. Bilateral 20-39% proximal ICA stenoses.



2. Antegrade flow in both vertebral arteries.

## 2018-02-20 VITALS — RESPIRATION RATE: 16 BRPM | TEMPERATURE: 98.8 F | OXYGEN SATURATION: 98 %

## 2018-02-20 VITALS — SYSTOLIC BLOOD PRESSURE: 127 MMHG | HEART RATE: 74 BPM | DIASTOLIC BLOOD PRESSURE: 84 MMHG

## 2018-02-20 NOTE — CON
DATE:  02/20/2018



INDICATIONS:  Atrial fib, stroke.



HISTORY OF PRESENT ILLNESS:  This is a 72-year-old man admitted on the 16th

with dizziness and difficulty speaking, described as slurred speech.  His

symptoms have subsequently improved.  During his hospitalization, he has

had a neurologic evaluation.  There was evidence of a para vermal acute

ischemic stroke.  He has known atrial fibrillation.  He was on Pradaxa, but

in talking to the patient, I find that he was probably taking the Pradaxa

only once a day, possibly this subtherapeutic dosing was  the cause for the 
stroke

on an embolic basis.



There was no chest pain, shortness of breath, palpitations, orthopnea, PND,

fever, chills, cough, sputum production, hemoptysis, abdominal pain,

nausea, vomiting, diarrhea, constipation or melena.



PAST MEDICAL HISTORY:  Notable for hypertension, atrial fibrillation.  He

has apparently undergone several stress tests over the years, the most

recent one was an unremarkable nuclear stress test in 01/2017.  He has

atrial fibrillation for many years.  He has been on Coumadin and more

recently Pradaxa.  While in the hospital, he was switched to Eliquis.  He

has a history of prostate cancer and he is on Lupron.  He has a history of

hyperlipidemia, gout and gallbladder surgery.  There is no history of

rheumatic fever, myocardial infarction, congestive heart failure, diabetes.



MEDICATIONS:  At the time of admission included spironolactone, Bystolic,

Lasix, Pradaxa which he was taking once a day, Pravachol, fenofibrate,

lisinopril, allopurinol.



ALLERGIES:  THERE ARE NO KNOWN MEDICATION ALLERGIES.



CURRENT MEDICATIONS:  Include spironolactone, Eliquis, Lasix, Lipitor,

nitro paste, Tricor, lisinopril and allopurinol.  He has not been on

Bystolic though his atrial fibrillation rate is well controlled.



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  He does not

smoke, he does not drink alcohol significantly.  He is ambulatory.



FAMILY HISTORY:  Noncontributory.



REVIEW OF SYSTEMS:  A 10-point review of systems otherwise unremarkable

except as noted above.



PHYSICAL EXAMINATION:

GENERAL:  He is a well-developed male, lying in bed, on telemetry, in no

acute distress.

VITAL SIGNS:  Notable for atrial fibrillation at 54 to 65 beats per

minutes, he is afebrile, 118/68, respirations 20, O2 sat 95% to 98% on room

air.

HEENT:  Reveals no neck vein distention, thyromegaly, carotid bruits. 

Mucous membranes moist.  Conjunctivae pink.

NECK:  Supple.

LUNGS:  Lung fields clear.

HEART:  Revealed an irregular rhythm, normal first and second heart sounds,

soft systolic murmur along the left sternal border, PMI not displaced.

ABDOMEN:  Soft.  Bowel sounds present.  No mass, organomegaly, tenderness,

rebound or guarding.  No CVA tenderness.  No palpable abdominal aortic

aneurysm.

EXTREMITIES:  Revealed no cyanosis, clubbing or edema.

NEUROLOGIC:  Awake, alert and oriented.

SKIN:  Warm and dry.  No rash or cellulitis.

PSYCHIATRIC:  Normalized mood and affect.



LABORATORY DATA AND IMAGING:  A chest x-ray revealed no active disease. 

EKG demonstrates atrial fibrillation with nonspecific ST wave changes. 

Several neuro imaging reports are reviewed, apparently there is an acute

paravermal ischemic stroke.  CBC is unremarkable.  PT, INR and PTT

unremarkable.  Chemistry is unremarkable.  LFTs unremarkable.  BUN 32,

creatinine 1.4, repeat 1.2, troponin less than 0.01, cholesterol 150, LDL

96, HDL 44, TSH normal, folate normal, B12 normal, vitamin D 36.2.



IMPRESSION:  Bruce Mack is a 72-year-old man with atrial

fibrillation who was on Pradaxa, but only taking it once a day.  He appears

to had an embolic stroke, the symptoms, which have resolved.  He is now on

Eliquis.  He has been seen by Neurology.  He has undergone evaluation.  An

echocardiogram revealed evidence of a bicuspid aortic valve.  Overall, left

ventricular function was said to be mildly impaired.  There was mild mitral

regurgitation.



PLAN:  Bruce Mack has an evidence of embolic stroke, probably on

embolic basis while taking Pradaxa 150 mg once a day.  I had discussed the

case with Dr. Kuhn.  He will be switched Eliquis.  He has been informed to

take it twice a day and not to miss doses.  He will follow up with his

regular cardiologist and medical physicians upon leaving the hospital.  

He will continue his prehospitalization medications as before.  

He will report any further neurologic symptoms promptly.



A Amharic speaking nurse served as an  for this interview. I spoke 
with Dr. Kuhn regarding his case.





__________________________________________

Willard Worthington MD



DD:  02/20/2018 11:12:57

DT:  02/20/2018 16:54:55

[facility] # [id number]



JOSEPH

## 2018-02-20 NOTE — CP.PCM.PN
Subjective





- Date & Time of Evaluation


Date of Evaluation: 02/20/18


Time of Evaluation: 11:00





- Subjective


Subjective: 





Neuro progress note: 





Pt was seen and examined at the bedside.  No acute events overnight. He is able 

to following commands appropriately. Pt denies any residual symptoms. AAo x 3. 

No complaints at this time. 





12 Point ROS performed and neg other than stated above. 





Objective





- Vital Signs/Intake and Output


Vital Signs (last 24 hours): 


 











Temp Pulse Resp BP Pulse Ox


 


 98.8 F   74   16   127/84   98 


 


 02/20/18 12:00  02/20/18 12:00  02/20/18 12:00  02/20/18 12:02  02/20/18 12:00








Intake and Output: 


 











 02/20/18 02/20/18





 06:59 18:59


 


Intake Total 1000 290


 


Output Total 900 


 


Balance 100 290














- Medications


Medications: 


 Current Medications





Acetaminophen (Tylenol 325mg Tab)  650 mg PO Q6H PRN


   PRN Reason: Fever >100.4 F


Allopurinol (Zyloprim)  300 mg PO DAILY [facility]


   Last Admin: 02/20/18 12:00 Dose:  300 mg


Apixaban (Eliquis)  5 mg PO BID [facility]


   PRN Reason: Protocol


   Last Admin: 02/20/18 12:00 Dose:  5 mg


Atorvastatin Calcium (Lipitor)  40 mg PO DIN [facility]


   Last Admin: 02/19/18 18:04 Dose:  40 mg


Fenofibrate (Tricor)  145 mg PO DAILY [facility]


   Last Admin: 02/20/18 12:00 Dose:  145 mg


Furosemide (Lasix)  40 mg PO DAILY [facility]


   Last Admin: 02/20/18 12:02 Dose:  40 mg


Sodium Chloride (Sodium Chloride 0.9%)  1,000 mls @ 50 mls/hr IV .Q20H [facility]


   Last Admin: 02/19/18 23:49 Dose:  50 mls/hr


Lisinopril (Zestril)  20 mg PO BID [facility]


   Last Admin: 02/20/18 12:00 Dose:  20 mg


Nitroglycerin (Nitro-Bid 2% Oint)  1 ea TOP Q4H PRN


   PRN Reason: accelerated hypertension


Ondansetron HCl (Zofran Inj)  4 mg IVP Q6H PRN


   PRN Reason: Nausea/Vomiting


Spironolactone (Aldactone)  12.5 mg PO DAILY [facility]


   Last Admin: 02/20/18 12:01 Dose:  12.5 mg











- Labs


Labs: 


 





 02/17/18 10:40 





 02/18/18 05:40 





 











PT  12.3 SECONDS (9.4-12.5)   02/16/18  19:49    


 


INR  1.08  (0.93-1.08)   02/16/18  19:49    


 


APTT  32.9 Seconds (25.1-36.5)   02/16/18  19:49    














- Constitutional


Appears: No Acute Distress





- Eye Exam


Eye Exam: EOMI, PERRL


Pupil Exam: NORMAL ACCOMODATION





- Neurological Exam


Neurological Exam: Alert, Awake, CN II-XII Intact, Oriented x3


Neuro motor strength exam: Left Upper Extremity: 5, Right Upper Extremity: 5, 

Left Lower Extremity: 5, Right Lower Extremity: 5


Additional comments: 





72-year-old man with a past medical history of hypertension, prostate CA, and A-

fib on Pradaxa, who presented to the ED after an episode of slurred speech, 

dizziness and near-syncope 2/2 small acute/ subacute R vermian infarct as per 

MRI 





- CTA head and neck - negative 


- Cont Eliquis, and Statin 


- F/u echo report 


- Carotid US -  neg 


- F/u cardiology recs 


- HOB elevation at 45 degrees


- Maintain strict blood pressure with systolics <160 


- Control serum glucose to maintain euglycemia blood sugars 140-180


- Neuro-checks


- PT and OT 





Case and plan was reviewed and discussed with Dr Mcclain.

## 2018-02-20 NOTE — PN
DATE:  02/20/2018



SUBJECTIVE:  This 72-year-old male was examined on the cardiac harris.  This

case was reviewed in detail with his nurse at the bedside, Ara Gutierrez, who

acted as a  and I have also discussed this case in detail today

with Dr. Willard Worthington from Cardiology who saw the patient earlier today. 

Of note, the patient had head and neck CTA and carotid artery ultrasounds,

which were unremarkable for carotid artery stenosis.  He did have an

echocardiogram that was reviewed by Dr. Worthington which confirms left atrial

enlargement, left ventricle normal in size, concentric left ventricular

hypertrophy, systolic left ventricle function mildly impaired with a

probable bicuspid aortic valve, mild pulmonary hypertension, and mild

mitral regurgitation.  Since a bubble test was not done on this study, it was

ordered and reviewed by Dr. Worthington who informs me that it is negative for any

evidence of patent foramen ovale  The case was re-reviewed with Dr. Worthington,

he feels the patient is safe for discharge on Eliquis 5 mg p.o. b.i.d. and

on his review, there was no evidence of any left ventricle thrombus.  He

told the patient to follow up with his primary care physician, Dr. Chris Ward regarding the issue of his congenital bicuspid aortic valve,

which will need to be monitored serially with echocardiography in his

future.  The patient is aware of this recommendation and is in agreement. 

At present, I am awaiting Neurology followup regarding clearance for

discharge.  On review of studies, brain MRI was positive for a right

vermian infarct, probably on the basis of his chronic atrial fibrillation

and Cardiology agrees with the adjustment of his anticoagulation from

Pradaxa to Eliquis 5 mg p.o. b.i.d.  The patient is completely fluent in

speech.  He has no motor defects and is ambulating independently.  Cardiac

monitor shows chronic atrial fibrillation.



PHYSICAL EXAMINATION:

VITAL SIGNS:  Temperature 98.8, respirations 16, pulse 74, and blood

pressure 127/84 with a pulse ox of 98%.

HEENT:  Head:  Normocephalic, atraumatic.  Eyes:  No icterus.  Ears: 

Clear.  Throat:  Noninjected.

NECK:  Supple.

HEART:  Irregular S1, S2.

LUNGS:  Clear.

ABDOMEN:  Soft.

EXTREMITIES:  No edema.

SKIN:  No rash.

VASCULAR:  No claudication.  Legs warm to touch.

PSYCHOLOGIC:  Alert and oriented x3.

NEUROLOGIC:  Grossly intact.



LABORATORY DATA:  White count 9300, hemoglobin 14.1, hematocrit 42.1,

platelets 316,000.  PT/INR 1.08, PTT 32.9.  Sodium 142, K 4.3, chloride

105, bicarb 26, BUN 24, creatinine 1.2, random blood sugar 106.  Hemoglobin

A1c normal 6.3.  Troponin less than 0.01.  Cholesterol 152, triglycerides

113, LDL 99, HDL 33.  B12 is 309, vitamin D level 36.2, folic acid 10.5. 

TSH normal 2.28.



IMPRESSION:  A 72-year-old male with new right vermian stroke on MRI and

comorbidities of chronic hypertension; mild cardiomyopathy; bicuspid aortic

valve; mild mitral regurgitation; dilated left atrium; chronic atrial

fibrillation, now on Eliquis 5 mg p.o. b.i.d.; hyperlipidemia; obesity;

degenerative arthritis; and gout.



PLAN:  To await Neurology followup.  The patient has been cleared by Dr. Worthington for discharge with instructions to follow up his echocardiography

issues of bicuspid aortic valve and mitral regurgitation and dilated left

atrium and reduced left ventricular ejection fraction with his cardiologist

and Dr. Chris Ward, his PMD.  The patient is aware and in agreement

with this.  He is also instructed he will need Neurology followup as

directed with his primary care physician's instructions and understands

that medications now include Aldactone 12.5 mg p.o. daily, Eliquis 5 mg

p.o. b.i.d., Lasix 40 mg p.o. daily, Lipitor 40 mg p.o. at dinner time,

TriCor 145 mg p.o. daily, Zestril 20 mg p.o. b.i.d., and Zyloprim 300 mg

p.o. daily.  He is instructed on a heart-healthy low cholesterol diet and

is aware of need for cautious outpatient monitoring upon discharge. 

Greater than 35 minutes was spent in the care management, review of x-rays,

labs, medication, discussion of this patient's overall results with him

with nursing at bedside and discussion with Dr. Willard Worthington from

Cardiology.  All questions were answered.





__________________________________________

Polly Kuhn MD



DD:  02/20/2018 13:34:57

DT:  02/20/2018 13:39:29

Job # [id number]

MTDD

## 2018-02-21 NOTE — DS
DATE; 02/20/2018





FINAL DIAGNOSES:

1.  Right vermian brain infarct.

2.  Chronic atrial fibrillation.

3.  Degenerative arthritis.

4.  Gout.

5.  Hyperlipidemia.

6.  Chronic hypertension.

7.  Cardiomyopathy.

8.  Obesity.



DISPOSITION: Home.



FOLLOWUP:  Follow up with his primary care physician, Dr. Chris Ward

within the next 48 hours.



CONSULTANTS:  Dr. Willard Worthington from Cardiology, Dr. Tiburcio Swain from

Neurology.



The patient was given copy of 2D echocardiogram and told to review this

with Dr. Ward.  This echo was reviewed in detail by myself and Dr. Willard Worthington showing a probable bicuspid aortic valve cardiomyopathy,

decreased left ventricular ejection fraction, and no evidence of thrombus,

no evidence of PFO.



DISCHARGE MEDICATIONS:  Will include Zyloprim 300 mg p.o. daily, Pravachol

40 mg p.o. daily, Zestril 40 mg p.o. daily, Lasix 40 mg p.o. daily,

Aldactone 12.5 mg p.o. daily, Eliquis 5 mg p.o. b.i.d.  The patient was

told to hold Lanoxin, Bystolic, and Ecotrin until further review with

primary care physician.



SUMMARY:  This 72-year-old male was admitted to [facility] with

concerns of stroke after presenting to ER complaining of slurred speech and

dizziness earlier on the day of admission.  He underwent a diagnostic

workup where initial CAT scan showed nonspecific findings, but a followup

brain MRI confirmed a right vermian infarct.  The patient had a complete

neurological evaluation by Dr. Tiburcio Swain.  He had no evidence of

significant carotid artery or intracranial arterial disease.  He had a 2D

echocardiogram done that showed no evidence of thrombus and a bubble echo

that was read by Dr. Willard Worthington who was called in consultation that

showed no evidence of PFO.  At the time of discharge, the patient was fully

ambulatory and without any slurred speech and with 5/5 motor strength and

excellent balance.  His vital signs showed temperature of 98.8,

respirations 16, pulse 74, and blood pressure 127/84 with a pulse ox of 98%

on room air.  Cardiac monitor, atrial fibrillation.  White count 9300,

hemoglobin 14.1, hematocrit 42.1, platelets 316,000.  PT/INR 1.08, PTT

32.8.  Sodium 142, K 4.3, chloride 105, bicarb 26.  BUN 24, creatinine 1.2.

Random blood sugar 106.  All liver function testing was normal including

bilirubin 0.5, AST 38, ALT 34, and alkaline phosphatase 63.  Hemoglobin A1c

6.3, normal.  B12, vitamin D, folic acid, and TSH levels were normal. 

Cholesterol 152, triglycerides 113, LDL 99, and HDL 33.  The patient was

discharged to home, given copies of echo and laboratories.  He was given a

revised medication list and told to follow up with his PMD within the next

2 days.  He was also told for any change in signs and symptoms to present

directly to the closest emergency room and he was fully apprised by both

myself and Dr. Worthington that he will need serial echocardiography, given his

abnormalities as listed above.  The patient was aware and in agreement with

the above.  This was witnessed in the presence of his nurse, Ara Gutierrez, registered nurse.



Greater than 35 minutes was spent in the care management, discharge, review

of this patient today.  All questions were answered.











__________________________________________

Polly Kuhn MD



DD:  02/20/2018 19:25:45

DT:  02/20/2018 19:29:13

Job # [id number]

[email or web address] no

## 2018-02-21 NOTE — CARD
--------------- APPROVED REPORT --------------





EXAM: Two-dimensional and M-mode echocardiogram with Doppler and 

color Doppler.



INDICATION

CVA/TIA 



 LEFT VENTRICLE 

The left ventricle is normal size. There is mild concentric left 

ventricular hypertrophy. The left ventricular function is normal. The 

left ventricular ejection fraction is within the normal range. There 

is normal LV segmental wall motion.



 RIGHT VENTRICLE 

The right ventricle is normal size. The right ventricular systolic 

function is normal.



 ATRIA 

The left atrium is moderately dilated. The right atrium is mildly 

dilated. The interatrial septum is intact with no evidence for an 

atrial septal defect. Injection of bubbles documented no interatrial 

shunt.



 AORTIC VALVE 

The aortic valve is normal in structure.



 MITRAL VALVE 

The mitral valve is normal in structure.



 TRICUSPID VALVE 

The tricuspid valve is normal in structure.



 GREAT VESSELS 

The aortic root is normal in size.



 PERICARDIAL EFFUSION 

There is no pericardial effusion.



<Conclusion>

Limited study performed. 

No parasternal images obtained.

Normal LV systolic function.

No valvular abnormalities noted.

The interatrial septum is intact with no evidence for an atrial 

septal defect or PFO based on bubble injection findings.

## 2023-06-12 ENCOUNTER — NEW REFERRAL (OUTPATIENT)
Dept: URBAN - METROPOLITAN AREA CLINIC 73 | Facility: CLINIC | Age: 78
End: 2023-06-12

## 2023-06-12 DIAGNOSIS — H35.81: ICD-10-CM

## 2023-06-12 DIAGNOSIS — H35.073: ICD-10-CM

## 2023-06-12 PROCEDURE — 92134 CPTRZ OPH DX IMG PST SGM RTA: CPT

## 2023-06-12 PROCEDURE — 92250 FUNDUS PHOTOGRAPHY W/I&R: CPT | Mod: NC

## 2023-06-12 PROCEDURE — 99204 OFFICE O/P NEW MOD 45 MIN: CPT | Mod: 25

## 2023-06-12 PROCEDURE — 92202 OPSCPY EXTND ON/MAC DRAW: CPT | Mod: 59

## 2023-06-12 PROCEDURE — 67028 INJECTION EYE DRUG: CPT

## 2023-06-12 ASSESSMENT — TONOMETRY
OD_IOP_MMHG: 14
OS_IOP_MMHG: 17

## 2023-06-12 ASSESSMENT — VISUAL ACUITY
OD_CC: 2/200
OS_CC: 20/30-2

## 2023-07-17 ENCOUNTER — FOLLOW UP (OUTPATIENT)
Dept: URBAN - METROPOLITAN AREA CLINIC 73 | Facility: CLINIC | Age: 78
End: 2023-07-17

## 2023-07-17 DIAGNOSIS — H35.073: ICD-10-CM

## 2023-07-17 DIAGNOSIS — H35.81: ICD-10-CM

## 2023-07-17 DIAGNOSIS — H35.3211: ICD-10-CM

## 2023-07-17 PROCEDURE — 67028 INJECTION EYE DRUG: CPT

## 2023-07-17 PROCEDURE — 92202 OPSCPY EXTND ON/MAC DRAW: CPT | Mod: 59

## 2023-07-17 PROCEDURE — 92134 CPTRZ OPH DX IMG PST SGM RTA: CPT

## 2023-07-17 PROCEDURE — 92014 COMPRE OPH EXAM EST PT 1/>: CPT | Mod: 25

## 2023-07-17 ASSESSMENT — VISUAL ACUITY
OS_SC: 20/60
OD_SC: 10/200

## 2023-07-17 ASSESSMENT — TONOMETRY
OS_IOP_MMHG: 15
OD_IOP_MMHG: 17

## 2023-08-28 ENCOUNTER — FOLLOW UP (OUTPATIENT)
Dept: URBAN - METROPOLITAN AREA CLINIC 73 | Facility: CLINIC | Age: 78
End: 2023-08-28

## 2023-08-28 DIAGNOSIS — H35.3122: ICD-10-CM

## 2023-08-28 DIAGNOSIS — H35.073: ICD-10-CM

## 2023-08-28 DIAGNOSIS — H35.81: ICD-10-CM

## 2023-08-28 DIAGNOSIS — H35.3211: ICD-10-CM

## 2023-08-28 PROCEDURE — 67028 INJECTION EYE DRUG: CPT

## 2023-08-28 PROCEDURE — 92014 COMPRE OPH EXAM EST PT 1/>: CPT | Mod: 25

## 2023-08-28 PROCEDURE — 92202 OPSCPY EXTND ON/MAC DRAW: CPT | Mod: 59

## 2023-08-28 PROCEDURE — 92134 CPTRZ OPH DX IMG PST SGM RTA: CPT

## 2023-08-28 ASSESSMENT — TONOMETRY
OD_IOP_MMHG: 10
OS_IOP_MMHG: 13

## 2023-08-28 ASSESSMENT — VISUAL ACUITY
OD_SC: 3/200
OS_SC: 20/40+1

## 2023-10-16 ENCOUNTER — FOLLOW UP (OUTPATIENT)
Dept: URBAN - METROPOLITAN AREA CLINIC 73 | Facility: CLINIC | Age: 78
End: 2023-10-16

## 2023-10-16 DIAGNOSIS — H35.3211: ICD-10-CM

## 2023-10-16 DIAGNOSIS — H35.073: ICD-10-CM

## 2023-10-16 DIAGNOSIS — H35.3122: ICD-10-CM

## 2023-10-16 PROCEDURE — 67028 INJECTION EYE DRUG: CPT

## 2023-10-16 PROCEDURE — 92202 OPSCPY EXTND ON/MAC DRAW: CPT | Mod: 59

## 2023-10-16 PROCEDURE — 92014 COMPRE OPH EXAM EST PT 1/>: CPT | Mod: 25

## 2023-10-16 PROCEDURE — 92134 CPTRZ OPH DX IMG PST SGM RTA: CPT

## 2023-10-16 PROCEDURE — 5MG INTRAVITREAL CIMERLI 0.5MG: Mod: JZ

## 2023-10-16 ASSESSMENT — TONOMETRY
OS_IOP_MMHG: 11
OD_IOP_MMHG: 12

## 2023-10-16 ASSESSMENT — VISUAL ACUITY
OS_SC: 20/40+2
OD_SC: 4/200